# Patient Record
Sex: FEMALE | Race: BLACK OR AFRICAN AMERICAN | NOT HISPANIC OR LATINO | URBAN - METROPOLITAN AREA
[De-identification: names, ages, dates, MRNs, and addresses within clinical notes are randomized per-mention and may not be internally consistent; named-entity substitution may affect disease eponyms.]

---

## 2017-08-08 ENCOUNTER — EMERGENCY (EMERGENCY)
Facility: HOSPITAL | Age: 57
LOS: 1 days | Discharge: HOME | End: 2017-08-08
Admitting: EMERGENCY MEDICINE
Payer: SELF-PAY

## 2017-08-08 VITALS
DIASTOLIC BLOOD PRESSURE: 84 MMHG | OXYGEN SATURATION: 98 % | RESPIRATION RATE: 18 BRPM | HEART RATE: 84 BPM | SYSTOLIC BLOOD PRESSURE: 134 MMHG | TEMPERATURE: 99 F

## 2017-08-08 VITALS
TEMPERATURE: 98 F | OXYGEN SATURATION: 100 % | SYSTOLIC BLOOD PRESSURE: 143 MMHG | DIASTOLIC BLOOD PRESSURE: 93 MMHG | HEART RATE: 81 BPM | RESPIRATION RATE: 18 BRPM

## 2017-08-08 DIAGNOSIS — N89.8 OTHER SPECIFIED NONINFLAMMATORY DISORDERS OF VAGINA: ICD-10-CM

## 2017-08-08 DIAGNOSIS — A64 UNSPECIFIED SEXUALLY TRANSMITTED DISEASE: ICD-10-CM

## 2017-08-08 LAB — HIV 1 & 2 AB SERPL IA.RAPID: SIGNIFICANT CHANGE UP

## 2017-08-08 PROCEDURE — 99284 EMERGENCY DEPT VISIT MOD MDM: CPT

## 2017-08-08 RX ORDER — CEFTRIAXONE 500 MG/1
250 INJECTION, POWDER, FOR SOLUTION INTRAMUSCULAR; INTRAVENOUS ONCE
Qty: 0 | Refills: 0 | Status: COMPLETED | OUTPATIENT
Start: 2017-08-08 | End: 2017-08-08

## 2017-08-08 RX ORDER — AZITHROMYCIN 500 MG/1
1000 TABLET, FILM COATED ORAL ONCE
Qty: 0 | Refills: 0 | Status: COMPLETED | OUTPATIENT
Start: 2017-08-08 | End: 2017-08-08

## 2017-08-08 RX ADMIN — CEFTRIAXONE 250 MILLIGRAM(S): 500 INJECTION, POWDER, FOR SOLUTION INTRAMUSCULAR; INTRAVENOUS at 20:53

## 2017-08-08 RX ADMIN — AZITHROMYCIN 1000 MILLIGRAM(S): 500 TABLET, FILM COATED ORAL at 20:53

## 2017-08-08 NOTE — ED PROVIDER NOTE - OBJECTIVE STATEMENT
56 y/o F with no significant pmhx c/o white vaginal d/c x 1 week. Pt reports h/o BV which presented with similar symptoms. Pt also reports recent unprotected sex and wants to be treated for STDs. Denies abdominal pain, dysuria, hematuria, f/c, n/v/d, or pelvic pain.

## 2017-08-08 NOTE — ED ADULT NURSE NOTE - OBJECTIVE STATEMENT
Patient is a 58 y/o female presents to the ED with vaginal discharge. Patient is a 58 y/o female presents to the ED with vaginal discharge x 1 week. Patient reports white vaginal discharge described as sticky, foul odor, and itching. Patient had unprotected sexual intercourse last week with known partner. Patient states she had similar episodes and given Flagyl (generic). Patient denies fever/chills, urinary/bowel s/s, or abdominal pain. Patient is a 58 y/o female presents to the ED with vaginal discharge x 1 week. Patient reports white vaginal discharge described as sticky, foul odor, and itching. Patient had unprotected sexual intercourse last week with known partner. Patient states she had similar episodes in the past, BV and given Flagyl (generic). Patient denies fever/chills, urinary/bowel s/s, or abdominal pain.

## 2017-08-08 NOTE — ED PROVIDER NOTE - MEDICAL DECISION MAKING DETAILS
pt,. with vaginal d/c, vss, exam unremarkable, low risk for std, however requesting testing and tx, referred to out pt. GYN.

## 2017-08-09 LAB
C TRACH RRNA SPEC QL NAA+PROBE: SIGNIFICANT CHANGE UP
HSV1 IGG SER-ACNC: 40.9 INDEX — HIGH
HSV1 IGG SERPL QL IA: POSITIVE
HSV2 IGG FLD-ACNC: 8.33 INDEX — HIGH
HSV2 IGG SERPL QL IA: POSITIVE
N GONORRHOEA RRNA SPEC QL NAA+PROBE: SIGNIFICANT CHANGE UP
SPECIMEN SOURCE: SIGNIFICANT CHANGE UP
T PALLIDUM AB TITR SER: NEGATIVE — SIGNIFICANT CHANGE UP

## 2017-08-10 NOTE — ED POST DISCHARGE NOTE - RESULT SUMMARY
Herpes 1/2 positive, called pt and d/w her, she will call [phone] for follow up appt patient access center

## 2017-10-10 ENCOUNTER — EMERGENCY (EMERGENCY)
Facility: HOSPITAL | Age: 57
LOS: 1 days | Discharge: HOME | End: 2017-10-10
Attending: EMERGENCY MEDICINE | Admitting: EMERGENCY MEDICINE
Payer: SELF-PAY

## 2017-10-10 VITALS
SYSTOLIC BLOOD PRESSURE: 139 MMHG | TEMPERATURE: 99 F | RESPIRATION RATE: 17 BRPM | DIASTOLIC BLOOD PRESSURE: 87 MMHG | HEART RATE: 80 BPM | OXYGEN SATURATION: 100 %

## 2017-10-10 VITALS — WEIGHT: 141.98 LBS

## 2017-10-10 DIAGNOSIS — L72.3 SEBACEOUS CYST: ICD-10-CM

## 2017-10-10 DIAGNOSIS — Z79.2 LONG TERM (CURRENT) USE OF ANTIBIOTICS: ICD-10-CM

## 2017-10-10 DIAGNOSIS — R51 HEADACHE: ICD-10-CM

## 2017-10-10 PROCEDURE — 99284 EMERGENCY DEPT VISIT MOD MDM: CPT

## 2017-10-10 RX ORDER — AZTREONAM 2 G
1 VIAL (EA) INJECTION
Qty: 20 | Refills: 0 | OUTPATIENT
Start: 2017-10-10 | End: 2017-10-20

## 2017-10-10 RX ADMIN — Medication 1 TABLET(S): at 19:33

## 2017-10-10 NOTE — ED PROVIDER NOTE - OBJECTIVE STATEMENT
56 y/o Female presents to the ED c/o facial pain. Pt states a lump on her right cheek under earlobe was initially small and now has worsened and is bigger in size. States pain is very mild. Denies fever

## 2017-10-10 NOTE — ED PROVIDER NOTE - SKIN, MLM
1x1.5 cm circular sebaceous cyst on right paricular. No erythema, no fixed, freely movable, no trismus.

## 2017-10-23 ENCOUNTER — EMERGENCY (EMERGENCY)
Facility: HOSPITAL | Age: 57
LOS: 1 days | Discharge: HOME | End: 2017-10-23
Attending: EMERGENCY MEDICINE | Admitting: EMERGENCY MEDICINE
Payer: SELF-PAY

## 2017-10-23 VITALS
DIASTOLIC BLOOD PRESSURE: 82 MMHG | SYSTOLIC BLOOD PRESSURE: 148 MMHG | OXYGEN SATURATION: 100 % | RESPIRATION RATE: 17 BRPM | HEART RATE: 86 BPM | TEMPERATURE: 97 F

## 2017-10-23 DIAGNOSIS — L02.01 CUTANEOUS ABSCESS OF FACE: ICD-10-CM

## 2017-10-23 PROCEDURE — 99283 EMERGENCY DEPT VISIT LOW MDM: CPT | Mod: 25

## 2017-10-23 PROCEDURE — 10060 I&D ABSCESS SIMPLE/SINGLE: CPT

## 2017-10-23 NOTE — ED PROVIDER NOTE - ENMT, MLM
Airway patent, Nasal mucosa clear. Mouth with normal mucosa. Throat has no vesicles, no oropharyngeal exudates and uvula is midline. (+) R facial abscess with no surrounding cellulitis, open with small amount of visible purulent and fibrous discharge

## 2017-10-23 NOTE — ED PROVIDER NOTE - OBJECTIVE STATEMENT
57 y.o female with R facial abscess, now with some purulent material extruding from the wound, cellulitis has resolved, no fever/chills, took last does of PO abx today.

## 2017-10-23 NOTE — ED PROVIDER NOTE - MEDICAL DECISION MAKING DETAILS
facial abscess, already draining, debridement I&D done in ED, stable for dc home, detailed wound care instruction given, stable for dc home

## 2021-05-19 NOTE — ED PROVIDER NOTE - TEMPLATE, MLM
Pre-Operative Instructions    1. The night before your surgery, unless otherwise instructed, do not eat any food, drink any liquids, chew gum or mints after midnight. Abstain from alcohol for 24 hours prior to surgery. 2. You will be contacted by the Hospital the working day prior to your procedure to confirm your arrival time. 3. Patients under 25years of age must have a parent or legal guardian present to sign their consent and discharge paperwork. 4. On the day of surgery,  you will be seen pre-operatively by an anesthesiologist.     5. If you are having hand surgery, it is recommended that nail polish and acrylic nails be removed prior to surgery if possible. 6. Please bring cases for glasses, contact lenses, hearing aids or dentures. They will likely be removed prior to surgery. 7. Wear casual, loose-fitting and comfortable clothing. Consider that you may have a large dressing to fit under your clothing after surgery. 9. Please do not bring valuables such as jewelry or large sums of cash to the hospital. Remove all body piercings before coming to the hospital. Milly Davidson may not  wear any rings on the hand if you are having surgery on that hand, wrist or elbow. 10. Do not smoke or chew tobacco before your surgery. [address] [place] [address] and surgery facilities are smoke-free environments. Smoking is not permitted anywhere on campus. 11. Be sure to follow any additional instructions from your physician. If the above conditions are not met, your surgery may be cancelled and rescheduled for another day. Should you develop any change in your health such as fever, cough, sore throat, cold, flu, or infection, or if you have any questions regarding your Pre-admission or surgery, please contact [address] - Surgery Scheduling at [phone], Monday through Friday, 9 a.m. to 5 p.m.
Facial

## 2025-04-07 NOTE — ED ADULT NURSE NOTE - FALL HARM RISK TYPE OF ASSESSMENT
Patient is a very pleasant 30-year-old female who presents for diarrhea and abdominal discomfort.  She is a patient of Dr. Villela.  Last seen 10/29/2021.  Past medical history denied.  Past surgical history reviewed.  Denies previous colonoscopy. Family history noncontributory.  Last endoscopy 10/12/2021. Patient presents for chief complaint of diarrhea x 2 weeks.  With urgency and water associated with cramping.  She went to her PCP and had stool PCR CBC and CMP within normal limits.  She then developed constipation.  She has fluctuation in bowel habits especially with fatty foods.  She has right upper quadrant discomfort that radiates to her back.  It is intermittent, worse with prandial events and improved with bowel movements.  Of note status post vaginal birth 4 months ago.  Continues breast-feeding.  She is dairy free because her infant is lactose intolerant.  She has had chronic heartburn since 19 and has been on different PPIs over the years.  It has been well-controlled lately.  She denies dysphagia, dyspepsia, pyrosis, unintentional weight loss, melena, hematochezia.
Daily Assessment